# Patient Record
Sex: MALE | URBAN - METROPOLITAN AREA
[De-identification: names, ages, dates, MRNs, and addresses within clinical notes are randomized per-mention and may not be internally consistent; named-entity substitution may affect disease eponyms.]

---

## 2021-01-01 ENCOUNTER — OFFICE VISIT (OUTPATIENT)
Dept: URBAN - METROPOLITAN AREA CLINIC 64 | Facility: CLINIC | Age: 86
End: 2021-01-01
Payer: MEDICARE

## 2021-01-01 DIAGNOSIS — H40.1132 PRIMARY OPEN-ANGLE GLAUCOMA, BILATERAL, MODERATE STAGE: ICD-10-CM

## 2021-01-01 DIAGNOSIS — H52.221 REGULAR ASTIGMATISM, RIGHT EYE: ICD-10-CM

## 2021-01-01 DIAGNOSIS — B02.33 ZOSTER KERATITIS: Primary | ICD-10-CM

## 2021-01-01 DIAGNOSIS — H35.3211 EXUDATIVE AGE-RELATED MACULAR DEGENERATION, RIGHT EYE, WITH ACTIVE CHOROIDAL NEOVASCULARIZATION: Primary | ICD-10-CM

## 2021-01-01 DIAGNOSIS — H40.1123 PRIMARY OPEN-ANGLE GLAUCOMA, LEFT EYE, SEVERE STAGE: Primary | ICD-10-CM

## 2021-01-01 DIAGNOSIS — H35.3223 EXUDATIVE MACULAR DEGENERATION, INACTIVE SCAR, LEFT EYE: ICD-10-CM

## 2021-01-01 DIAGNOSIS — H40.1111 PRIMARY OPEN-ANGLE GLAUCOMA, RIGHT EYE, MILD STAGE: ICD-10-CM

## 2021-01-01 PROCEDURE — 99213 OFFICE O/P EST LOW 20 MIN: CPT | Performed by: OPTOMETRIST

## 2021-01-01 PROCEDURE — 92250 FUNDUS PHOTOGRAPHY W/I&R: CPT | Performed by: OPHTHALMOLOGY

## 2021-01-01 PROCEDURE — 92134 CPTRZ OPH DX IMG PST SGM RTA: CPT | Performed by: OPHTHALMOLOGY

## 2021-01-01 PROCEDURE — 67028 INJECTION EYE DRUG: CPT | Performed by: OPHTHALMOLOGY

## 2021-01-01 PROCEDURE — 99214 OFFICE O/P EST MOD 30 MIN: CPT | Performed by: OPHTHALMOLOGY

## 2021-01-01 PROCEDURE — 92014 COMPRE OPH EXAM EST PT 1/>: CPT | Performed by: OPTOMETRIST

## 2021-01-01 PROCEDURE — 99204 OFFICE O/P NEW MOD 45 MIN: CPT | Performed by: OPTOMETRIST

## 2021-01-01 PROCEDURE — 92134 CPTRZ OPH DX IMG PST SGM RTA: CPT | Performed by: OPTOMETRIST

## 2021-01-01 PROCEDURE — 99204 OFFICE O/P NEW MOD 45 MIN: CPT | Performed by: OPHTHALMOLOGY

## 2021-01-01 RX ORDER — ACYCLOVIR 800 MG/1
800 MG TABLET ORAL
Qty: 50 | Refills: 0 | Status: ACTIVE
Start: 2021-01-01

## 2021-01-01 RX ORDER — ACYCLOVIR 800 MG/1
800 MG TABLET ORAL
Qty: 50 | Refills: 0 | Status: INACTIVE
Start: 2021-01-01 | End: 2021-01-01

## 2021-01-01 ASSESSMENT — INTRAOCULAR PRESSURE
OS: 20
OS: 15
OD: 9
OD: 15
OD: 20
OS: 16
OD: 15
OD: 11
OD: 13
OD: 10
OS: 18
OS: 25
OS: 11
OS: 20
OD: 17
OS: 21
OS: 12
OS: 13
OD: 18

## 2021-01-01 ASSESSMENT — VISUAL ACUITY: OD: 20/50

## 2021-04-07 NOTE — IMPRESSION/PLAN
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 Plan: Stable. Recommmend another round of Acyclovir 800mg 5X a day. Use systane frequently OD (q1-2h). RTC in 2 wks.

## 2021-04-21 NOTE — IMPRESSION/PLAN
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 Plan: Resolved. Off Acyclovir 800mg 5X a day. Continue to use systane frequently OD.

## 2021-04-26 NOTE — IMPRESSION/PLAN
Impression: Primary open-angle glaucoma, left eye, severe stage: W29.2733. Plan: He has never been on Rx eye drops. Denies heart or lung problems. Recommend he start timolol 0.5% qam OU. RTC for IOP check in 3-4 weeks. 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

## 2021-04-26 NOTE — IMPRESSION/PLAN
Impression: Regular astigmatism, right eye: H52.221. Plan: Updated glasses rx as requested. No significant change in BCVA. Polycarb lenses.

## 2021-05-19 NOTE — IMPRESSION/PLAN
Impression: Primary open-angle glaucoma, left eye, severe stage: Q04.5962. Plan: He has never been on Rx eye drops. Denies heart or lung problems. Started timolol 0.5% qam OU. IOP improved OU today. Continue with timolol qam OU. RTC for IOP check in 6 months. GLC combined with atrophic AMD account for poor vision OS.
Impression: Primary open-angle glaucoma, right eye, mild stage: H40.1111. Plan: See above. Recommend timolol OU to protect the right eye, only good eye.
negative...

## 2021-05-20 NOTE — IMPRESSION/PLAN
Impression: WET AMD Right eye -- h/o injx in [place] - Txfr to [place] '20 Plan: RIGHT eye -- better eye -- Known h/o wAMD w inj OD in [place]. Periodic skip inj if stable TODAY  Avastin OD inj ([facility] - Proc note)   Monitor w caution. Sometimes skip. RTC 8-10w  dil, OCT, eval - h/o Avastin OD w [facility] [place] --  
Future ND2?     Unable FA neck

## 2021-07-20 NOTE — IMPRESSION/PLAN
Impression: WET AMD Right eye -- h/o injx in [place] Txfr to [facility] '20 Plan: hx:[[RIGHT (better eye) - Known h/o wAMD w inj OD in [place]. Periodic skip inj if stable]] TODAY  Avastin OD inj ([facility] - Proc note)   Monitor w caution. Sometimes skip. RTC 10w ND1 inj/OCT plan Avastin OD w [facility] of [place] --  Future ND2?   NO FA --neck

## 2021-07-20 NOTE — IMPRESSION/PLAN
Impression: Glaucoma, right  mild stage Plan: Stay on timolol OU  - keep f/u w [facility] / [facility].    Exam shows mod C/d

## 2021-09-23 NOTE — IMPRESSION/PLAN
Impression: WET AMD Right eye -- h/[place] injx in [place] Txfr to [facility] '20 - EXTEND / Maintain Plan: hx:[[RIGHT (better eye) - Known h/o wAMD w inj OD in [place]. Periodic skip inj if stable]] TODAY  Avastin OD inj ([facility] - Proc note)   Monitor w caution. Sometimes skip.   
  RTC 10w ND2 inj/pos OCT plan Avastin OD w [facility] [place] -  Future exam?  NO FA